# Patient Record
Sex: FEMALE | Race: WHITE | HISPANIC OR LATINO
[De-identification: names, ages, dates, MRNs, and addresses within clinical notes are randomized per-mention and may not be internally consistent; named-entity substitution may affect disease eponyms.]

---

## 2017-04-11 ENCOUNTER — APPOINTMENT (OUTPATIENT)
Dept: ORTHOPEDIC SURGERY | Facility: CLINIC | Age: 32
End: 2017-04-11

## 2017-09-26 ENCOUNTER — RESULT REVIEW (OUTPATIENT)
Age: 32
End: 2017-09-26

## 2018-09-12 ENCOUNTER — EMERGENCY (EMERGENCY)
Facility: HOSPITAL | Age: 33
LOS: 1 days | Discharge: HOME | End: 2018-09-12
Admitting: EMERGENCY MEDICINE
Payer: COMMERCIAL

## 2018-09-12 VITALS
SYSTOLIC BLOOD PRESSURE: 100 MMHG | TEMPERATURE: 98 F | HEART RATE: 79 BPM | RESPIRATION RATE: 16 BRPM | DIASTOLIC BLOOD PRESSURE: 58 MMHG | OXYGEN SATURATION: 100 %

## 2018-09-12 PROCEDURE — 99053 MED SERV 10PM-8AM 24 HR FAC: CPT

## 2018-09-12 PROCEDURE — 99284 EMERGENCY DEPT VISIT MOD MDM: CPT | Mod: 25

## 2018-09-12 RX ORDER — ACETAMINOPHEN 500 MG
650 TABLET ORAL ONCE
Qty: 0 | Refills: 0 | Status: COMPLETED | OUTPATIENT
Start: 2018-09-12 | End: 2018-09-12

## 2018-09-12 RX ADMIN — Medication 650 MILLIGRAM(S): at 23:57
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
## 2018-09-12 NOTE — ED PROVIDER NOTE - PLAN OF CARE
pls rest, drink plenty of fluids, tylenol/motrin for pain, f/u with your pmd, return for any worsening symptoms or any other concerning symptoms

## 2018-09-13 VITALS
OXYGEN SATURATION: 100 % | TEMPERATURE: 98 F | RESPIRATION RATE: 16 BRPM | SYSTOLIC BLOOD PRESSURE: 100 MMHG | HEART RATE: 73 BPM | DIASTOLIC BLOOD PRESSURE: 60 MMHG

## 2018-09-13 PROCEDURE — 70450 CT HEAD/BRAIN W/O DYE: CPT | Mod: 26

## 2018-10-23 ENCOUNTER — RESULT REVIEW (OUTPATIENT)
Age: 33
End: 2018-10-23

## 2020-08-06 ENCOUNTER — RESULT REVIEW (OUTPATIENT)
Age: 35
End: 2020-08-06

## 2022-08-29 ENCOUNTER — APPOINTMENT (OUTPATIENT)
Dept: OBGYN | Facility: CLINIC | Age: 37
End: 2022-08-29
Payer: COMMERCIAL

## 2022-08-29 VITALS
SYSTOLIC BLOOD PRESSURE: 98 MMHG | WEIGHT: 121 LBS | HEIGHT: 61 IN | BODY MASS INDEX: 22.84 KG/M2 | DIASTOLIC BLOOD PRESSURE: 64 MMHG

## 2022-08-29 DIAGNOSIS — N76.0 ACUTE VAGINITIS: ICD-10-CM

## 2022-08-29 DIAGNOSIS — Z01.419 ENCOUNTER FOR GYNECOLOGICAL EXAMINATION (GENERAL) (ROUTINE) W/OUT ABNORMAL FINDINGS: ICD-10-CM

## 2022-08-29 DIAGNOSIS — N94.6 DYSMENORRHEA, UNSPECIFIED: ICD-10-CM

## 2022-08-29 DIAGNOSIS — B96.89 ACUTE VAGINITIS: ICD-10-CM

## 2022-08-29 LAB
HCG UR QL: POSITIVE
QUALITY CONTROL: YES

## 2022-08-29 PROCEDURE — 81025 URINE PREGNANCY TEST: CPT

## 2022-08-29 PROCEDURE — 99072 ADDL SUPL MATRL&STAF TM PHE: CPT

## 2022-08-29 PROCEDURE — 90649 4VHPV VACCINE 3 DOSE IM: CPT

## 2022-08-29 PROCEDURE — 90471 IMMUNIZATION ADMIN: CPT

## 2022-08-29 PROCEDURE — 99385 PREV VISIT NEW AGE 18-39: CPT | Mod: 25

## 2022-08-29 RX ORDER — NAPROXEN 500 MG/1
500 TABLET, DELAYED RELEASE ORAL
Qty: 30 | Refills: 0 | Status: ACTIVE | COMMUNITY
Start: 2022-08-29 | End: 1900-01-01

## 2022-08-29 NOTE — DISCUSSION/SUMMARY
[FreeTextEntry1] : encouraged pt to exercise 2.5 hours minimally a week as per the [facility] , to monitor her cycles, to take calcium foods equalling 1000 mg daily and vitamin D 1000 IU daily and fish oil or flax seed oil for omega 3s. REturn in one yr for a visit. questions answered. Encouraged SBE\par

## 2022-08-29 NOTE — HISTORY OF PRESENT ILLNESS
[Currently Active] : currently active [Men] : men [Vaginal] : vaginal [No] : No [FreeTextEntry1] : 36 yo here for a new pt visit. SHe had cryotherapy no abnormal paps since that time. regular  cycles, withdrawl. no complaints. First day of menses is painful. \par Previous doctor had her on OC to help bv and balance hormons because of infections. SHe has now been off 3-4 yrs and it is better now. \par Today she feels like she has a little dc. \par Pt has had+ HPV in past had 2 shots will get third today

## 2022-08-30 LAB — HPV HIGH+LOW RISK DNA PNL CVX: NOT DETECTED

## 2022-09-01 ENCOUNTER — NON-APPOINTMENT (OUTPATIENT)
Age: 37
End: 2022-09-01

## 2022-09-01 PROBLEM — N76.0 BV (BACTERIAL VAGINOSIS): Status: RESOLVED | Noted: 2022-09-01 | Resolved: 2022-10-01

## 2022-09-01 LAB — CYTOLOGY CVX/VAG DOC THIN PREP: ABNORMAL

## 2022-09-01 RX ORDER — METRONIDAZOLE 7.5 MG/G
0.75 GEL VAGINAL
Qty: 1 | Refills: 0 | Status: ACTIVE | COMMUNITY
Start: 2022-09-01 | End: 1900-01-01

## 2024-08-23 ENCOUNTER — APPOINTMENT (OUTPATIENT)
Dept: OBGYN | Facility: CLINIC | Age: 39
End: 2024-08-23

## 2024-08-27 ENCOUNTER — NON-APPOINTMENT (OUTPATIENT)
Age: 39
End: 2024-08-27

## 2024-08-28 ENCOUNTER — APPOINTMENT (OUTPATIENT)
Dept: OBGYN | Facility: CLINIC | Age: 39
End: 2024-08-28
Payer: MEDICAID

## 2024-08-28 VITALS
BODY MASS INDEX: 22.09 KG/M2 | SYSTOLIC BLOOD PRESSURE: 101 MMHG | HEIGHT: 61 IN | WEIGHT: 117 LBS | DIASTOLIC BLOOD PRESSURE: 64 MMHG

## 2024-08-28 DIAGNOSIS — Z87.440 PERSONAL HISTORY OF URINARY (TRACT) INFECTIONS: ICD-10-CM

## 2024-08-28 DIAGNOSIS — N63.10 UNSPECIFIED LUMP IN THE RIGHT BREAST, UNSPECIFIED QUADRANT: ICD-10-CM

## 2024-08-28 DIAGNOSIS — Z01.419 ENCOUNTER FOR GYNECOLOGICAL EXAMINATION (GENERAL) (ROUTINE) W/OUT ABNORMAL FINDINGS: ICD-10-CM

## 2024-08-28 DIAGNOSIS — Z98.82 BREAST IMPLANT STATUS: ICD-10-CM

## 2024-08-28 PROCEDURE — 99385 PREV VISIT NEW AGE 18-39: CPT

## 2024-08-28 PROCEDURE — 99214 OFFICE O/P EST MOD 30 MIN: CPT | Mod: 25

## 2024-08-30 NOTE — PLAN
[FreeTextEntry1] : Clinical breast exam performed significant for bilateral implants.  Patient does have concern regarding her right breast and a feeling of a straining and pulling sensation causing her breast to shift to the right.  Response alternatives discussed at length and she will undergo a breast ultrasound.  Rx placed in the system with direction.  Patient also counseled she may want to follow-up with her breast surgeon for assessment.  Cervical Pap smear performed importance of yearly well woman appointments and cervical Pap smears reviewed.  Patient with subjective history of possible UTI and has a history of recurrent UTIs.  She is currently on ciprofloxacin for diagnosis of Lyme disease.  Culture will be sent, no need for alternative antibiotic at this time.  She is a cigarette smoker and was counseled on cessation.  She will return to office in 1 years time, or as needed.  She is to follow-up with her PCP as directed.

## 2024-08-30 NOTE — HISTORY OF PRESENT ILLNESS
[FreeTextEntry1] : 39-year-old female G0, P0 presenting the office for her annual well woman appointment, and a concern regarding recurrent UTI and symptoms of UTI today.  She would like a urine culture performed.  She is usually prescribed ciprofloxacin for UTI.  She also has a complaint of some right breast discomfort/feeling like there is a string pulling her breast to the side.  She does have a history of bilateral implants, augmentation was performed 5 years ago.  Denies obstetric history.  Gynecologic history significant for abnormal cervical Pap smear undergoing cryotherapy with subsequent normal Pap smears.  Menstruation started at age 17 occurring regularly lasting approximately 4 to 5 days with first 2 being uncomfortable with cramping.  Medical history significant for current Lyme disease diagnosis and she is currently on doxycycline.  Surgical history of breast augmentation 5 years prior.  Family history significant for paternal grandmother with uterine cancer in her 50s to 60s, maternal grandmother with breast cancer later in life.  History of cigarette smoking and social alcohol use.  Denies allergies to medications.

## 2024-08-30 NOTE — COUNSELING
[Nutrition/ Exercise/ Weight Management] : nutrition, exercise, weight management [Vitamins/Supplements] : vitamins/supplements [Breast Self Exam] : breast self exam [Confidentiality] : confidentiality [Lab Results] : lab results [Medication Management] : medication management [Yes] : Risk of tobacco use and health benefits of smoking cessation discussed: Yes

## 2024-08-30 NOTE — PHYSICAL EXAM
[Appropriately responsive] : appropriately responsive [Alert] : alert [No Acute Distress] : no acute distress [Soft] : soft [Non-tender] : non-tender [Non-distended] : non-distended [No HSM] : No HSM [No Lesions] : no lesions [No Mass] : no mass [Oriented x3] : oriented x3 [Examination Of The Breasts] : a normal appearance [] : implants [No Masses] : no breast masses were palpable [Labia Majora] : normal [Labia Minora] : normal [Normal] : normal [FreeTextEntry5] : Cervical Pap smear performed

## 2024-08-30 NOTE — REASON FOR VISIT
[Annual] : an annual visit. [FreeTextEntry2] : Annual well woman appointment with additional concerns

## 2024-09-01 LAB
BACTERIA UR CULT: NORMAL
HPV HIGH+LOW RISK DNA PNL CVX: NOT DETECTED

## 2024-09-03 LAB — CYTOLOGY CVX/VAG DOC THIN PREP: NORMAL

## 2024-09-08 PROCEDURE — 81025 URINE PREGNANCY TEST: CPT

## 2024-09-08 PROCEDURE — 99284 EMERGENCY DEPT VISIT MOD MDM: CPT

## 2024-09-08 PROCEDURE — 81001 URINALYSIS AUTO W/SCOPE: CPT

## 2024-09-08 PROCEDURE — 99283 EMERGENCY DEPT VISIT LOW MDM: CPT

## 2024-09-08 PROCEDURE — 87086 URINE CULTURE/COLONY COUNT: CPT

## 2024-09-09 ENCOUNTER — EMERGENCY (EMERGENCY)
Facility: HOSPITAL | Age: 39
LOS: 0 days | Discharge: HOME | End: 2024-09-09
Attending: HOSPITALIST
Payer: MEDICAID

## 2024-09-09 VITALS
OXYGEN SATURATION: 100 % | TEMPERATURE: 99 F | WEIGHT: 116.18 LBS | DIASTOLIC BLOOD PRESSURE: 59 MMHG | HEART RATE: 62 BPM | RESPIRATION RATE: 15 BRPM | SYSTOLIC BLOOD PRESSURE: 109 MMHG

## 2024-09-09 VITALS
OXYGEN SATURATION: 97 % | DIASTOLIC BLOOD PRESSURE: 50 MMHG | RESPIRATION RATE: 14 BRPM | SYSTOLIC BLOOD PRESSURE: 101 MMHG | TEMPERATURE: 98 F | HEART RATE: 90 BPM

## 2024-09-09 DIAGNOSIS — N39.0 URINARY TRACT INFECTION, SITE NOT SPECIFIED: ICD-10-CM

## 2024-09-09 DIAGNOSIS — R10.9 UNSPECIFIED ABDOMINAL PAIN: ICD-10-CM

## 2024-09-09 LAB
APPEARANCE UR: ABNORMAL
BACTERIA # UR AUTO: NEGATIVE /HPF — SIGNIFICANT CHANGE UP
BILIRUB UR-MCNC: NEGATIVE — SIGNIFICANT CHANGE UP
CAST: 1 /LPF — SIGNIFICANT CHANGE UP (ref 0–4)
COD CRY URNS QL: PRESENT
COLOR SPEC: SIGNIFICANT CHANGE UP
COMMENT - URINE: SIGNIFICANT CHANGE UP
DIFF PNL FLD: NEGATIVE — SIGNIFICANT CHANGE UP
GLUCOSE UR QL: NEGATIVE MG/DL — SIGNIFICANT CHANGE UP
KETONES UR-MCNC: ABNORMAL MG/DL
LEUKOCYTE ESTERASE UR-ACNC: ABNORMAL
NITRITE UR-MCNC: NEGATIVE — SIGNIFICANT CHANGE UP
PH UR: 5.5 — SIGNIFICANT CHANGE UP (ref 5–8)
POCT URINE PREGNANCY TEST: NEGATIVE — SIGNIFICANT CHANGE UP
PROT UR-MCNC: 30 MG/DL
RBC CASTS # UR COMP ASSIST: 4 /HPF — SIGNIFICANT CHANGE UP (ref 0–4)
SP GR SPEC: 1.03 — SIGNIFICANT CHANGE UP (ref 1–1.03)
SQUAMOUS # UR AUTO: 3 /HPF — SIGNIFICANT CHANGE UP (ref 0–5)
UROBILINOGEN FLD QL: 1 MG/DL — SIGNIFICANT CHANGE UP (ref 0.2–1)
WBC UR QL: 43 /HPF — HIGH (ref 0–5)

## 2024-09-09 RX ORDER — CEFUROXIME SODIUM 1.5 G
1 VIAL (EA) INJECTION
Qty: 14 | Refills: 0
Start: 2024-09-09 | End: 2024-09-15

## 2024-09-09 RX ORDER — CEFUROXIME SODIUM 1.5 G
500 VIAL (EA) INJECTION EVERY 12 HOURS
Refills: 0 | Status: DISCONTINUED | OUTPATIENT
Start: 2024-09-09 | End: 2024-09-09

## 2024-09-09 RX ADMIN — Medication 500 MILLIGRAM(S): at 02:39

## 2024-09-09 NOTE — ED PROVIDER NOTE - OBJECTIVE STATEMENT
39-year-old female with urinary frequency and burning for the past 3 days.  Patient states that she also has a little bit of right flank/back pain which she has had before in the past associated with urinary tract infection.  Denies any fevers nausea vomiting or diarrhea.  No vaginal discharge.

## 2024-09-09 NOTE — ED PROVIDER NOTE - CLINICAL SUMMARY MEDICAL DECISION MAKING FREE TEXT BOX
39F with dysuria and suspected UTI. will check UA and UCG. labs noted. will give antibiotics and dc home.

## 2024-09-09 NOTE — ED ADULT TRIAGE NOTE - CHIEF COMPLAINT QUOTE
Pt ambulates to ED co urinary frequency, burning with urination, R flank pain since this afternoon. Pt has PMHx UTIs leading to kidney infections, NKDA, AOx4. Pt states she's on Doxycin for Lime disease.

## 2024-09-09 NOTE — ED ADULT NURSE NOTE - NSFALLUNIVINTERV_ED_ALL_ED
Bed/Stretcher in lowest position, wheels locked, appropriate side rails in place/Call bell, personal items and telephone in reach/Instruct patient to call for assistance before getting out of bed/chair/stretcher/Non-slip footwear applied when patient is off stretcher/[place] to call system/Physically safe environment - no spills, clutter or unnecessary equipment/Purposeful proactive rounding/Room/bathroom lighting operational, light cord in reach

## 2024-09-09 NOTE — ED ADULT NURSE NOTE - OBJECTIVE STATEMENT
Pt ambulates to ED co urinary frequency, burning with urination, R flank pain since this afternoon. Pt has PMHx UTIs leading to kidney infections, NKDA, AOx4. pmh of lyme disease

## 2024-09-09 NOTE — ED PROVIDER NOTE - NSFOLLOWUPINSTRUCTIONS_ED_ALL_ED_FT
please take antibiotics as prescribed  please follow up with your primary care doctor in the next few days for follow up care.

## 2024-09-09 NOTE — ED PROVIDER NOTE - PATIENT PORTAL LINK FT
You can access the FollowMyHealth Patient Portal offered by [facility] by registering at the following website: [email or web address]. By joining [facility]’s FollowMyHealth portal, you will also be able to view your health information using other applications (apps) compatible with our system.

## 2024-09-10 LAB
CULTURE RESULTS: NO GROWTH — SIGNIFICANT CHANGE UP
SPECIMEN SOURCE: SIGNIFICANT CHANGE UP

## 2025-09-09 ENCOUNTER — APPOINTMENT (OUTPATIENT)
Dept: OBGYN | Facility: CLINIC | Age: 40
End: 2025-09-09
Payer: MEDICAID

## 2025-09-09 VITALS
BODY MASS INDEX: 20.77 KG/M2 | SYSTOLIC BLOOD PRESSURE: 100 MMHG | HEIGHT: 61 IN | DIASTOLIC BLOOD PRESSURE: 66 MMHG | WEIGHT: 110 LBS

## 2025-09-09 DIAGNOSIS — Z01.419 ENCOUNTER FOR GYNECOLOGICAL EXAMINATION (GENERAL) (ROUTINE) W/OUT ABNORMAL FINDINGS: ICD-10-CM

## 2025-09-09 DIAGNOSIS — Z98.82 BREAST IMPLANT STATUS: ICD-10-CM

## 2025-09-09 PROCEDURE — 99396 PREV VISIT EST AGE 40-64: CPT

## 2025-09-11 ENCOUNTER — RESULT REVIEW (OUTPATIENT)
Age: 40
End: 2025-09-11

## 2025-09-11 ENCOUNTER — APPOINTMENT (OUTPATIENT)
Dept: MAMMOGRAPHY | Facility: CLINIC | Age: 40
End: 2025-09-11
Payer: MEDICAID

## 2025-09-11 ENCOUNTER — APPOINTMENT (OUTPATIENT)
Dept: ULTRASOUND IMAGING | Facility: CLINIC | Age: 40
End: 2025-09-11
Payer: MEDICAID

## 2025-09-11 LAB — HPV HIGH+LOW RISK DNA PNL CVX: NOT DETECTED

## 2025-09-11 PROCEDURE — 77067 SCR MAMMO BI INCL CAD: CPT | Mod: 26

## 2025-09-11 PROCEDURE — 76641 ULTRASOUND BREAST COMPLETE: CPT | Mod: 26,50

## 2025-09-11 PROCEDURE — 77063 BREAST TOMOSYNTHESIS BI: CPT | Mod: 26

## 2025-09-15 ENCOUNTER — NON-APPOINTMENT (OUTPATIENT)
Age: 40
End: 2025-09-15

## 2025-09-15 DIAGNOSIS — R92.8 OTHER ABNORMAL AND INCONCLUSIVE FINDINGS ON DIAGNOSTIC IMAGING OF BREAST: ICD-10-CM

## 2025-09-15 LAB — CYTOLOGY CVX/VAG DOC THIN PREP: NORMAL
